# Patient Record
Sex: MALE | Race: ASIAN | NOT HISPANIC OR LATINO | URBAN - METROPOLITAN AREA
[De-identification: names, ages, dates, MRNs, and addresses within clinical notes are randomized per-mention and may not be internally consistent; named-entity substitution may affect disease eponyms.]

---

## 2020-07-30 ENCOUNTER — LAB OUTSIDE AN ENCOUNTER (OUTPATIENT)
Dept: URBAN - METROPOLITAN AREA CLINIC 37 | Facility: CLINIC | Age: 60
End: 2020-07-30

## 2020-07-30 ENCOUNTER — OFFICE VISIT (OUTPATIENT)
Dept: URBAN - METROPOLITAN AREA CLINIC 37 | Facility: CLINIC | Age: 60
End: 2020-07-30

## 2020-07-30 VITALS — WEIGHT: 150 LBS | HEIGHT: 64 IN | BODY MASS INDEX: 25.61 KG/M2

## 2020-07-30 PROBLEM — 426749004: Status: ACTIVE | Noted: 2020-07-30

## 2020-07-30 PROBLEM — 59621000: Status: ACTIVE | Noted: 2020-07-30

## 2020-07-30 PROBLEM — 85898001: Status: ACTIVE | Noted: 2020-07-30

## 2020-07-30 PROBLEM — 305058001: Status: ACTIVE | Noted: 2020-07-30

## 2020-07-30 RX ORDER — METOPROLOL SUCCINATE 50 MG/1
1 TABLET TABLET, FILM COATED, EXTENDED RELEASE ORAL
Status: ACTIVE | COMMUNITY

## 2020-07-30 RX ORDER — ATORVASTATIN CALCIUM 20 MG/1
1 TABLET TABLET, FILM COATED ORAL ONCE A DAY
Qty: 30 | Status: ACTIVE | COMMUNITY

## 2020-07-30 RX ORDER — WARFARIN SODIUM 5 MG/1
1 TABLET TABLET ORAL ONCE A DAY
Qty: 30 | Status: ACTIVE | COMMUNITY

## 2020-07-30 RX ORDER — GINKGO BILOBA LEAF EXTRACT 120 MG
AS DIRECTED CAPSULE ORAL
Status: ACTIVE | COMMUNITY

## 2020-07-30 RX ORDER — OMEPRAZOLE 40 MG/1
1 CAPSULE CAPSULE, DELAYED RELEASE ORAL
Qty: 30 | Status: ACTIVE | COMMUNITY

## 2020-07-30 RX ORDER — OMEPRAZOLE 40 MG/1
1 CAPSULE CAPSULE, DELAYED RELEASE ORAL
Qty: 30 | Refills: 1 | OUTPATIENT
Start: 2020-07-30

## 2020-07-30 NOTE — HPI-MIGRATED HPI
Initial consultation : Patient is here for -> Dysphagia and Early satiety Patient was previously evaluated by Dr. Yunior Stephenson due to dysphagia and indigestion  Onset of symptoms: 3 weeks to 1 month ago Characteristics: getting full quickly and difficulty swallowing associated with discomfort in the mid-sternal and throat area . He denies painful swallowing Aggravating factors: worsened by food, recently he also has difficulty to swallow water  Relieving factors: Burping makes some relief  Dr. Stephenson ordered an UBT test to r/o H. pylori, results not available to review  Also was prescribed Omeprazole 40 mg daily . He just started since July 24, 2020 and advised to seek GI for further evaluation Last Colonoscopy done 04/21/2017 in [place], [place] and performed by Dr. Sabas Collazo. During procedure no polyps were detected. Non-bleeding internal hemorrhoids were found during retroflextion but not banded. Advised to repeat in 10 years.  Current BM: one normal BM daily;

## 2020-08-07 ENCOUNTER — OFFICE VISIT (OUTPATIENT)
Dept: URBAN - METROPOLITAN AREA MEDICAL CENTER 10 | Facility: MEDICAL CENTER | Age: 60
End: 2020-08-07

## 2020-08-20 ENCOUNTER — DASHBOARD ENCOUNTERS (OUTPATIENT)
Age: 60
End: 2020-08-20

## 2020-08-20 PROBLEM — 40739000: Status: ACTIVE | Noted: 2020-07-30

## 2020-08-20 PROBLEM — 8493009: Status: ACTIVE | Noted: 2020-08-20

## 2020-08-20 PROBLEM — 365980008: Status: ACTIVE | Noted: 2020-07-30

## 2020-08-21 ENCOUNTER — OFFICE VISIT (OUTPATIENT)
Dept: URBAN - METROPOLITAN AREA CLINIC 37 | Facility: CLINIC | Age: 60
End: 2020-08-21

## 2020-08-21 VITALS — HEIGHT: 64 IN | WEIGHT: 149 LBS | BODY MASS INDEX: 25.44 KG/M2

## 2020-08-21 RX ORDER — OMEPRAZOLE 40 MG/1
1 CAPSULE CAPSULE, DELAYED RELEASE ORAL
Qty: 30 | Refills: 1 | Status: ACTIVE | COMMUNITY
Start: 2020-07-30

## 2020-08-21 RX ORDER — GINKGO BILOBA LEAF EXTRACT 120 MG
AS DIRECTED CAPSULE ORAL
Status: ACTIVE | COMMUNITY

## 2020-08-21 RX ORDER — WARFARIN SODIUM 5 MG/1
1 TABLET TABLET ORAL ONCE A DAY
Qty: 30 | Status: ACTIVE | COMMUNITY

## 2020-08-21 RX ORDER — OMEPRAZOLE 40 MG/1
1 CAPSULE CAPSULE, DELAYED RELEASE ORAL
Qty: 30 | Status: ACTIVE | COMMUNITY

## 2020-08-21 RX ORDER — OMEPRAZOLE 40 MG/1
1 CAPSULE CAPSULE, DELAYED RELEASE ORAL
Qty: 90 | Refills: 0
Start: 2020-07-30

## 2020-08-21 RX ORDER — METOPROLOL SUCCINATE 50 MG/1
1 TABLET TABLET, FILM COATED, EXTENDED RELEASE ORAL
Status: ACTIVE | COMMUNITY

## 2020-08-21 RX ORDER — ATORVASTATIN CALCIUM 20 MG/1
1 TABLET TABLET, FILM COATED ORAL ONCE A DAY
Qty: 30 | Status: ACTIVE | COMMUNITY

## 2020-08-21 NOTE — HPI-MIGRATED HPI
Post-op OV : Patient denies -> rectal bleeding, fever, nausea & vomiting since the procedure date;   Post-op OV : Patient -> denies any new changes in his/her health status since last OV;   Post-op OV : After EGD on -> 08/07/2020, Erythema was present in the lower third of the esophagus and GE junction with distal esophageal bxx showing histologic changes suggestive of mild reflux esophagitis and rare intraepithelial eosinophils present ( from 0-1 eosinophils per 40 X HPF). s/p empiric Savary dilatation of size 18 mm with mild resistance. The GE junction was irregular. 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 No H. pylori or IM identified. Normal duodenum.; This procedure was done for GI pathology due to dysphagia.; Patient was advised to continue with Omeprazole 40 mg PO daily after the procedure;   Post-op OV : Patient reports of current symptoms -> Denies heartburn or reflux but occasionally has mild symptoms of dysphagia;

## 2020-08-24 ENCOUNTER — OFFICE VISIT (OUTPATIENT)
Dept: URBAN - METROPOLITAN AREA CLINIC 37 | Facility: CLINIC | Age: 60
End: 2020-08-24

## 2020-08-24 VITALS — WEIGHT: 149 LBS | BODY MASS INDEX: 25.44 KG/M2 | HEIGHT: 64 IN

## 2020-08-24 RX ORDER — OMEPRAZOLE 40 MG/1
1 CAPSULE CAPSULE, DELAYED RELEASE ORAL
Qty: 30 | Refills: 1 | Status: ON HOLD | COMMUNITY
Start: 2020-07-30 | End: 2020-08-17

## 2020-08-24 RX ORDER — WARFARIN SODIUM 5 MG/1
1 TABLET TABLET ORAL ONCE A DAY
Qty: 30 | Status: ACTIVE | COMMUNITY

## 2020-08-24 RX ORDER — ATORVASTATIN CALCIUM 20 MG/1
1 TABLET TABLET, FILM COATED ORAL ONCE A DAY
Qty: 30 | Status: ACTIVE | COMMUNITY

## 2020-08-24 RX ORDER — OMEPRAZOLE 40 MG/1
1 CAPSULE CAPSULE, DELAYED RELEASE ORAL
Qty: 30 | Status: ACTIVE | COMMUNITY

## 2020-08-24 RX ORDER — METOPROLOL SUCCINATE 50 MG/1
1 TABLET TABLET, FILM COATED, EXTENDED RELEASE ORAL
Status: ACTIVE | COMMUNITY

## 2020-08-24 RX ORDER — GINKGO BILOBA LEAF EXTRACT 120 MG
AS DIRECTED CAPSULE ORAL
Status: ACTIVE | COMMUNITY